# Patient Record
Sex: MALE | Race: WHITE | NOT HISPANIC OR LATINO | Employment: OTHER | URBAN - NONMETROPOLITAN AREA
[De-identification: names, ages, dates, MRNs, and addresses within clinical notes are randomized per-mention and may not be internally consistent; named-entity substitution may affect disease eponyms.]

---

## 2023-08-05 ENCOUNTER — HOSPITAL ENCOUNTER (EMERGENCY)
Facility: HOSPITAL | Age: 73
Discharge: HOME | End: 2023-08-05
Attending: FAMILY MEDICINE
Payer: MEDICARE

## 2023-08-05 ENCOUNTER — APPOINTMENT (OUTPATIENT)
Dept: RADIOLOGY | Facility: HOSPITAL | Age: 73
End: 2023-08-05
Payer: MEDICARE

## 2023-08-05 VITALS
HEIGHT: 70 IN | SYSTOLIC BLOOD PRESSURE: 165 MMHG | DIASTOLIC BLOOD PRESSURE: 98 MMHG | WEIGHT: 176 LBS | TEMPERATURE: 97.4 F | RESPIRATION RATE: 33 BRPM | OXYGEN SATURATION: 94 % | BODY MASS INDEX: 25.2 KG/M2 | HEART RATE: 86 BPM

## 2023-08-05 DIAGNOSIS — R00.2 PALPITATIONS: ICD-10-CM

## 2023-08-05 DIAGNOSIS — R07.89 OTHER CHEST PAIN: Primary | ICD-10-CM

## 2023-08-05 DIAGNOSIS — E16.2 HYPOGLYCEMIA: ICD-10-CM

## 2023-08-05 DIAGNOSIS — I10 HYPERTENSION: ICD-10-CM

## 2023-08-05 LAB
ALBUMIN SERPL-MCNC: 4 G/DL (ref 3.5–5.3)
ALP SERPL-CCNC: 68 U/L (ref 45–115)
ALT SERPL-CCNC: 14 U/L (ref 7–52)
ANION GAP SERPL CALC-SCNC: 9 MMOL/L (ref 3–11)
AST SERPL-CCNC: 22 U/L
BACTERIA #/AREA URNS HPF: ABNORMAL /HPF
BASOPHILS # BLD AUTO: 0.1 10*3/UL
BASOPHILS NFR BLD AUTO: 0.9 % (ref 0–2)
BILIRUB SERPL-MCNC: 0.68 MG/DL (ref 0.2–1.4)
BILIRUB UR QL: NEGATIVE
BNP SERPL-MCNC: 23 PG/ML (ref 0–100)
BUN SERPL-MCNC: 20 MG/DL (ref 7–25)
CALCIUM ALBUM COR SERPL-MCNC: 8.8 MG/DL (ref 8.6–10.3)
CALCIUM SERPL-MCNC: 8.8 MG/DL (ref 8.6–10.3)
CHLORIDE SERPL-SCNC: 100 MMOL/L (ref 98–107)
CLARITY UR: ABNORMAL
CO2 SERPL-SCNC: 25 MMOL/L (ref 21–32)
COLOR UR: YELLOW
CREAT SERPL-MCNC: 0.97 MG/DL (ref 0.7–1.3)
DELTA HIGH SENSITIVITY TROPONIN I, 1 HOUR: 0.3
EGFRCR SERPLBLD CKD-EPI 2021: 83 ML/MIN/1.73M*2
EOSINOPHIL # BLD AUTO: 0.1 10*3/UL
EOSINOPHIL NFR BLD AUTO: 1.1 % (ref 0–3)
ERYTHROCYTE [DISTWIDTH] IN BLOOD BY AUTOMATED COUNT: 16.5 % (ref 11.5–15)
EST. AVERAGE GLUCOSE BLD GHB EST-MCNC: 154.2 MG/DL
FIBRIN D-DIMER (NG/ML) IN PLATELET POOR PLASMA: 465 NG/MLFEU
GLUCOSE SERPL-MCNC: 210 MG/DL (ref 70–105)
GLUCOSE UR QL: 500 MG/DL
HBA1C MFR BLD: 7 % (ref 4.8–6)
HCT VFR BLD AUTO: 34.3 % (ref 38–50)
HGB BLD-MCNC: 11 G/DL (ref 13.2–17.2)
HGB UR QL: ABNORMAL
HYPOCHROMIA PRESENCE IN BLOOD, ANALYZER: ABNORMAL
KETONES UR-MCNC: 15 MG/DL
LEUKOCYTE ESTERASE UR QL STRIP: ABNORMAL
LYMPHOCYTES # BLD AUTO: 0.7 10*3/UL
LYMPHOCYTES NFR BLD AUTO: 10.8 % (ref 15–47)
MAGNESIUM SERPL-MCNC: 1.7 MG/DL (ref 1.8–2.4)
MCH RBC QN AUTO: 26.3 PG (ref 29–34)
MCHC RBC AUTO-ENTMCNC: 32.1 G/DL (ref 32–36)
MCV RBC AUTO: 82 FL (ref 82–97)
MONOCYTES # BLD AUTO: 0.6 10*3/UL
MONOCYTES NFR BLD AUTO: 9.5 % (ref 5–13)
NEUTROPHILS # BLD AUTO: 5.3 10*3/UL
NEUTROPHILS NFR BLD AUTO: 77.7 % (ref 46–70)
NITRITE UR QL: POSITIVE
PH UR: 6.5 PH
PLATELET # BLD AUTO: 250 10*3/UL (ref 130–350)
PMV BLD AUTO: 8 FL (ref 6.9–10.8)
POTASSIUM SERPL-SCNC: 3.3 MMOL/L (ref 3.5–5.1)
PROT SERPL-MCNC: 7.2 G/DL (ref 6–8.3)
PROT UR STRIP-MCNC: 30 MG/DL
RBC # BLD AUTO: 4.18 10*6/ΜL (ref 4.1–5.8)
RBC #/AREA URNS HPF: ABNORMAL /HPF
SODIUM SERPL-SCNC: 134 MMOL/L (ref 135–145)
SP GR UR: 1.02 (ref 1–1.03)
SQUAMOUS #/AREA URNS HPF: ABNORMAL /HPF
TROPONIN I SERPL-MCNC: 8 PG/ML
TROPONIN I SERPL-MCNC: 8.3 PG/ML
TSH SERPL DL<=0.05 MIU/L-ACNC: 0.88 UIU/ML (ref 0.34–4.82)
UROBILINOGEN UR-MCNC: 0.2 MG/DL
WBC # BLD AUTO: 6.8 10*3/UL (ref 3.7–9.6)
WBC #/AREA URNS HPF: ABNORMAL /HPF

## 2023-08-05 PROCEDURE — 99285 EMERGENCY DEPT VISIT HI MDM: CPT | Performed by: FAMILY MEDICINE

## 2023-08-05 PROCEDURE — 6360000200 HC RX 636 W HCPCS (ALT 250 FOR IP): Performed by: FAMILY MEDICINE

## 2023-08-05 PROCEDURE — 93005 ELECTROCARDIOGRAM TRACING: CPT | Performed by: FAMILY MEDICINE

## 2023-08-05 PROCEDURE — 85025 COMPLETE CBC W/AUTO DIFF WBC: CPT | Performed by: FAMILY MEDICINE

## 2023-08-05 PROCEDURE — 84443 ASSAY THYROID STIM HORMONE: CPT | Performed by: FAMILY MEDICINE

## 2023-08-05 PROCEDURE — 99284 EMERGENCY DEPT VISIT MOD MDM: CPT

## 2023-08-05 PROCEDURE — 83735 ASSAY OF MAGNESIUM: CPT | Performed by: FAMILY MEDICINE

## 2023-08-05 PROCEDURE — 83036 HEMOGLOBIN GLYCOSYLATED A1C: CPT | Performed by: FAMILY MEDICINE

## 2023-08-05 PROCEDURE — 71046 X-RAY EXAM CHEST 2 VIEWS: CPT

## 2023-08-05 PROCEDURE — 96366 THER/PROPH/DIAG IV INF ADDON: CPT

## 2023-08-05 PROCEDURE — 83880 ASSAY OF NATRIURETIC PEPTIDE: CPT | Performed by: FAMILY MEDICINE

## 2023-08-05 PROCEDURE — 81001 URINALYSIS AUTO W/SCOPE: CPT | Performed by: FAMILY MEDICINE

## 2023-08-05 PROCEDURE — 85379 FIBRIN DEGRADATION QUANT: CPT | Performed by: FAMILY MEDICINE

## 2023-08-05 PROCEDURE — 84484 ASSAY OF TROPONIN QUANT: CPT | Performed by: FAMILY MEDICINE

## 2023-08-05 PROCEDURE — 6370000100 HC RX 637 (ALT 250 FOR IP): Performed by: FAMILY MEDICINE

## 2023-08-05 PROCEDURE — 80053 COMPREHEN METABOLIC PANEL: CPT | Performed by: FAMILY MEDICINE

## 2023-08-05 PROCEDURE — 36415 COLL VENOUS BLD VENIPUNCTURE: CPT | Performed by: FAMILY MEDICINE

## 2023-08-05 PROCEDURE — 51798 US URINE CAPACITY MEASURE: CPT

## 2023-08-05 PROCEDURE — 96365 THER/PROPH/DIAG IV INF INIT: CPT

## 2023-08-05 RX ORDER — SULFAMETHOXAZOLE AND TRIMETHOPRIM 800; 160 MG/1; MG/1
1 TABLET ORAL ONCE
Status: COMPLETED | OUTPATIENT
Start: 2023-08-05 | End: 2023-08-05

## 2023-08-05 RX ORDER — ASPIRIN 81 MG/1
81 TABLET ORAL DAILY
COMMUNITY

## 2023-08-05 RX ORDER — MAGNESIUM SULFATE HEPTAHYDRATE 40 MG/ML
2 INJECTION, SOLUTION INTRAVENOUS ONCE
Status: COMPLETED | OUTPATIENT
Start: 2023-08-05 | End: 2023-08-05

## 2023-08-05 RX ORDER — METOPROLOL SUCCINATE 50 MG/1
50 TABLET, EXTENDED RELEASE ORAL DAILY
Qty: 30 TABLET | Refills: 0 | Status: SHIPPED | OUTPATIENT
Start: 2023-08-05 | End: 2023-09-04

## 2023-08-05 RX ORDER — ALPRAZOLAM 0.25 MG/1
0.25 TABLET ORAL 3 TIMES DAILY PRN
COMMUNITY

## 2023-08-05 RX ORDER — CELECOXIB 200 MG/1
200 CAPSULE ORAL 2 TIMES DAILY
COMMUNITY

## 2023-08-05 RX ORDER — PRAVASTATIN SODIUM 20 MG/1
20 TABLET ORAL DAILY
COMMUNITY

## 2023-08-05 RX ORDER — POTASSIUM CHLORIDE 750 MG/1
20 TABLET, FILM COATED, EXTENDED RELEASE ORAL ONCE
Status: COMPLETED | OUTPATIENT
Start: 2023-08-05 | End: 2023-08-05

## 2023-08-05 RX ORDER — INSULIN GLARGINE 100 [IU]/ML
INJECTION, SOLUTION SUBCUTANEOUS NIGHTLY
COMMUNITY

## 2023-08-05 RX ORDER — POTASSIUM CHLORIDE 750 MG/1
40 TABLET, FILM COATED, EXTENDED RELEASE ORAL ONCE
Status: COMPLETED | OUTPATIENT
Start: 2023-08-05 | End: 2023-08-05

## 2023-08-05 RX ORDER — NITROGLYCERIN 0.4 MG/1
0.4 TABLET SUBLINGUAL EVERY 5 MIN PRN
Qty: 10 TABLET | Refills: 0 | Status: SHIPPED | OUTPATIENT
Start: 2023-08-05

## 2023-08-05 RX ORDER — METOPROLOL SUCCINATE 25 MG/1
50 TABLET, EXTENDED RELEASE ORAL ONCE
Status: COMPLETED | OUTPATIENT
Start: 2023-08-05 | End: 2023-08-05

## 2023-08-05 RX ADMIN — METOPROLOL SUCCINATE 50 MG: 25 TABLET, EXTENDED RELEASE ORAL at 17:40

## 2023-08-05 RX ADMIN — POTASSIUM CHLORIDE 40 MEQ: 750 TABLET, FILM COATED, EXTENDED RELEASE ORAL at 16:10

## 2023-08-05 RX ADMIN — POTASSIUM CHLORIDE 20 MEQ: 750 TABLET, FILM COATED, EXTENDED RELEASE ORAL at 17:50

## 2023-08-05 RX ADMIN — SULFAMETHOXAZOLE AND TRIMETHOPRIM 1 PACKAGE: 800; 160 TABLET ORAL at 17:55

## 2023-08-05 RX ADMIN — MAGNESIUM SULFATE HEPTAHYDRATE 2 G: 40 INJECTION, SOLUTION INTRAVENOUS at 16:10

## 2023-08-05 ASSESSMENT — ENCOUNTER SYMPTOMS
NAUSEA: 1
DIZZINESS: 0
MYALGIAS: 0
HEADACHES: 0
COUGH: 1
BLOOD IN STOOL: 0
HEMATURIA: 0
LIGHT-HEADEDNESS: 0
DYSURIA: 1
SHORTNESS OF BREATH: 1
PALPITATIONS: 1
DIFFICULTY URINATING: 1
VOMITING: 0
ARTHRALGIAS: 0
DIARRHEA: 0
ABDOMINAL PAIN: 0
CONSTIPATION: 0
CHILLS: 0
FEVER: 0
SORE THROAT: 0

## 2023-08-05 NOTE — DISCHARGE INSTRUCTIONS
Call [phone] [facility] on Monday to schedule stress test. If they are unable to schedule this as advised for Tuesday or you have any concerns for worsening chest pain or chest pain that is not resolving quickly with one nitro, then please return to the ED.     Start metoprolol 50mg daily. First dose given in the ED, but please continue with next dose tomorrow morning and take every morning.     You can take nitro 1 dose up to every 5 minutes under the tongue. Do not take more than 3 doses within 15 minutes. If you need to take subsequent doses or chest pain is not improving with this, return to the ED. If you experience racing heart that is not improving within a few minutes or you feel like you are going to pass out with this, return to the ED.    Take your Tresiba daily as prescribed but decrease dose to 10U. Follow up with your primary care doctor when you return to [place].     Start Sulfamethoxazole for urinary tract infection 1 tablet twice daily for one week. If symptoms persist, follow up with your primary doctor or urology when you are completed with antibiotics. Return for further evaluation if new/worsening urinary symptoms or development of fevers/chills.

## 2023-08-05 NOTE — ED PROVIDER NOTES
HPI:      Chief Complaint   Patient presents with    Chest Pain     Pt states having intermittent chest pressure for the last two days. Pt has a history of Afib.       HPI  Shashank is a 72 y.o. male patient presenting to the ED for chest pain.     For the last 2-3 days has had episodes of chest pressure, dull ache at his chest, racing heart with palpitations, shortness of breath, coughing and nausea that lasts a few minutes. Triggered by exertion, relieved by rest. No symptoms at this time. History of a few episodes in 2009 of atrial fibrillation, says he was drinking heavily and under emotional stress at that time. No longer on any beta blockers, has never been on anticoagulation.     Also a type 2 diabetic and recently switched from Tresiba to Lantus. Takes 25-30U of Lantus daily PRN if glucose >250 but every morning his Dexcom alarm goes off that he is less than 70. Also on Ozempic 0.5mg weekly.     He has been traveling the last few days from [place] and staying at the [facility] [place] on [address].     Arrives by ambulance.    He has a history of bladder and testicular cancer, denies any history of blood clots, no lower extremity pain or swelling. On testosterone replacement for hypogonadism.    He is on Xanax daily, but when he runs out of this he takes a venlafaxine PRN but says this is not prescribed by his doctor. Trazodone for insomnia.     Drinks a pint of alcohol at a time, notes will have spells of depression.     History of HTN and HLD noted in chart, not on BP medication.     HISTORY:    Not on File       No current facility-administered medications on file prior to encounter.     Current Outpatient Medications on File Prior to Encounter   Medication Sig    aspirin 81 mg EC tablet Take 1 tablet (81 mg total) by mouth daily    semaglutide (OZEMPIC) 0.25 mg or 0.5 mg(2 mg/1.5 mL) injection Inject under the skin once a week    celecoxib (CeleBREX) 200 mg capsule Take 1 capsule (200 mg total) by  mouth 2 (two) times a day    pravastatin (PRAVACHOL) 20 mg tablet Take 1 tablet (20 mg total) by mouth daily    insulin glargine (Lantus U-100 Insulin) 100 unit/mL injection Inject under the skin nightly    ALPRAZolam (XANAX) 0.25 mg tablet Take 1 tablet (0.25 mg total) by mouth 3 (three) times a day as needed for anxiety Max Daily Amount: 0.75 mg    VENLAFAXINE BESYLATE ORAL Take by mouth        Past Medical History:   Diagnosis Date    A-fib (CMS/[facility])     Arthritis     Diabetes mellitus (CMS/[facility])     Hypercholesteremia        Past Surgical History:   Procedure Laterality Date    JOINT REPLACEMENT         History reviewed. No pertinent family history.    Social History     Tobacco Use    Smoking status: Former     Types: Cigarettes    Smokeless tobacco: Never   Vaping Use    Vaping Use: Never used   Substance Use Topics    Alcohol use: Yes     Comment: 1 pint a week intermittenly    Drug use: Never       ROS:  Review of Systems   Constitutional:  Negative for chills and fever.   HENT:  Negative for congestion and sore throat.    Respiratory:  Positive for cough and shortness of breath.    Cardiovascular:  Positive for chest pain and palpitations. Negative for leg swelling.   Gastrointestinal:  Positive for nausea. Negative for abdominal pain, blood in stool, constipation, diarrhea and vomiting.   Genitourinary:  Positive for difficulty urinating and dysuria (since cystoscopy, gradually improving). Negative for decreased urine volume, hematuria, penile pain, penile swelling and testicular pain.   Musculoskeletal:  Negative for arthralgias and myalgias.   Neurological:  Negative for dizziness, light-headedness and headaches.       PE:  ED Triage Vitals   Temp Heart Rate Resp BP SpO2   08/05/23 1516 08/05/23 1516 08/05/23 1516 08/05/23 1515 08/05/23 1516   36.3 °C (97.4 °F) 95 18 (!) 157/106 95 %      Mean BP (mmHg) Temp src Heart Rate Source Patient Position BP Location   08/05/23 1515 -- -- -- --   126          FiO2  (%)       --                  Physical Exam  Vitals and nursing note reviewed.   Constitutional:       General: He is not in acute distress.     Appearance: Normal appearance. He is normal weight. He is not ill-appearing, toxic-appearing or diaphoretic.   HENT:      Head: Normocephalic and atraumatic.      Mouth/Throat:      Mouth: Mucous membranes are moist.      Pharynx: Oropharynx is clear.   Eyes:      General: No scleral icterus.     Conjunctiva/sclera: Conjunctivae normal.   Cardiovascular:      Rate and Rhythm: Normal rate and regular rhythm.      Heart sounds: No murmur heard.  Pulmonary:      Effort: Pulmonary effort is normal. No respiratory distress.      Breath sounds: Normal breath sounds. No stridor. No wheezing or rhonchi.   Abdominal:      General: Abdomen is flat. Bowel sounds are normal. There is no distension.      Palpations: Abdomen is soft. There is no mass.      Tenderness: There is no abdominal tenderness.   Musculoskeletal:         General: No swelling or tenderness.      Right lower leg: No edema.      Left lower leg: No edema.   Neurological:      General: No focal deficit present.      Mental Status: He is alert and oriented to person, place, and time.   Psychiatric:         Mood and Affect: Mood normal.         ED LABS:  Labs Reviewed   CBC WITH AUTO DIFFERENTIAL - Abnormal       Result Value    WBC 6.8      RBC 4.18      Hemoglobin 11.0 (*)     Hematocrit 34.3 (*)     MCV 82.0      MCH 26.3 (*)     MCHC 32.1      RDW 16.5 (*)     Platelets 250      MPV 8.0      Neutrophils% 77.7 (*)     Lymphocytes% 10.8 (*)     Monocytes% 9.5      Eosinophils% 1.1      Basophils% 0.9      ANC (auto diff) 5.30      Lymphocytes Absolute 0.70      Monocytes Absolute 0.60      Eosinophils Absolute 0.10      Basophils Absolute 0.10      Hypochromia 1+     COMPREHENSIVE METABOLIC PANEL - Abnormal    Sodium 134 (*)     Potassium 3.3 (*)     Chloride 100      CO2 25      Anion Gap 9      BUN 20      Creatinine  0.97      Glucose 210 (*)     Calcium 8.8      AST 22      ALT (SGPT) 14      Alkaline Phosphatase 68      Total Protein 7.2      Albumin 4.0      Total Bilirubin 0.68      Corrected Calcium 8.8      eGFR 83      Narrative:     Calculation based on the 2021 Chronic Kidney Disease Epidemiology Collaboration (CKD-EPI) equation refit without adjustment for race.   MAGNESIUM - Abnormal    Magnesium 1.7 (*)    HEMOGLOBIN A1C (GLYCOSYLATED) - Abnormal    Hemoglobin A1C 7.0 (*)     Estimated Average Glucose 154.2      Narrative:     This assay is FDA cleared and indicated to monitor long-term glucose control in individuals with diabetes mellitus.                                    ---------American Diabetes Association (ADA)2021----------                                    Therapeutic goals for glycemic control:                   All ages: <7% HbA1C                                    Falsely low HbA1c results may be observed in patients with clinical conditions that shorten erythrocyte life span or decrease mean erythrocyte age. HbA1c may not accurately reflect glycemic control when clinical conditions that affect erythrocyte survival are present. Fructosamine may be used as an alternate measurement of glycemic control.                     URINALYSIS, MICROSCOPIC ONLY - Abnormal    RBC, Urine 3-5 (*)     WBC, Urine 50-75 (*)     Squamous Epithelial, Urine None seen      Bacteria, Urine Many (*)    URINALYSIS, DIPSTICK ONLY, FOR USE WITH MICROSCOPIC PANEL - Abnormal    Color, Urine Yellow      Clarity, Urine Cloudy (*)     Specific Gravity, Urine 1.020      Leukocytes, Urine Moderate (*)     Nitrite, Urine Positive (*)     Protein, Urine 30 (*)     Ketones, Urine 15 (*)     Urobilinogen, Urine 0.2      Bilirubin, Urine Negative      Blood, Urine Trace-intact (*)     Glucose, Urine 500 (*)     pH, Urine 6.5     D-DIMER, QUANTITATIVE - Normal    D-Dimer, Quant (ng/mL) 465      Narrative:     D-dimer values increase with age  "and this can make VTE exclusion of an older population difficult. To address this, The American College of Physicians, based on best available evidence and recent guidelines, recommends that clinicians use age-adjusted D-dimer thresholds in patients greater than 50 years of age with: a) a low probability of PE who do not meet all Pulmonary Embolism Rule Out Criteria, or b) in those with intermediate probability of PE. The formula for an age-adjusted D-dimer cut-off is \"ageX10 ng/mL\". For example, a 60 year old patient would have an age-adjusted cut-off of 600 ng/mL FEU and an 80 year old would be 800 ng/mL FEU.                  D-dimer values < or =500 ng/mL FEU may be used in conjunction with clinical pretest probability to exclude deep vein thrombosis (DVT) and pulmonary embolism (PE).   HIGH SENSITIVE TROPONIN I - Normal    hsTnI 0 Hour 8.0     HIGH SENSITIVE TROPONIN I, 1 HOUR - Normal    hsTnI 1 hr 8.3      Delta from 0 Hour 0.3     TSH - Normal    TSH 0.881     B-TYPE NATRIURETIC PEPTIDE (BNP) - Normal    BNP 23     HIGH SENSITIVE TROPONIN I PANEL (0HR, 1HR, 2HR)    Narrative:     The following orders were created for panel order HS Troponin I Panel (0HR, 1HR, 2HR) Blood, Venous.                  Procedure                               Abnormality         Status                                     ---------                               -----------         ------                                     HS Troponin I[[id number]]                 Normal              Final result                               1HR High Sensitive Trop I[[id number]]     Normal              Final result                               2HR High Sensitive Tropon...[[id number]]                                                                                   Please view results for these tests on the individual orders.   URINALYSIS WITH MICROSCOPIC    Narrative:     The following orders were created for panel order Urinalysis w/microscopic Urine, " Clean Catch.                  Procedure                               Abnormality         Status                                     ---------                               -----------         ------                                     Urinalysis, microscopic U...[[id number]]  Abnormal            Final result                               Urinalysis, dipstick Urin...[[id number]]  Abnormal            Final result                                                 Please view results for these tests on the individual orders.         ED IMAGES:  X-ray chest 2 views   Final Result   IMPRESSION:   No acute radiographic cardiopulmonary process.          ED PROCEDURES:  Procedures    ED COURSE:       MDM:  MDM  EKG shows a normal sinus rhythm. He is asymptomatic at this time. Will continue to monitor on tele. Rates in the 90s to 80s. Blood pressure mildly elevated, improving. He does have a HTN history but not currently on medications.     Hemoglobin is 11, down from 14 a year ago with outside record review.    With recent travel, will check d-dimer, no lower extremity symptoms. D-dimer is not elevated. Pulse is in the 90s here. Wells score is low risk for PE.     He shows me with Dexcom his glucose is currently 178, was 118 with EMS. Discussed with him hypoglycemia episodes may be contributing to his chest symptoms. He also has a history of anxiety and current alcohol abuse. Does not use caffeine regularly.     His A1c is 7.    He is at high risk of ACS, troponin initially 8. Chest Xray is clear, no fluid overload on exam and no CHF history. BNP is not elevated.     K is 3.3 and magnesium is 1.7, will replace with 2g IV magnesium and oral potassium.     Bladder scan 115cc, has been unable to void here.     No evidence of atrial fibrillation on telemetry, no recurrence of chest discomfort. 50mg metoprolol XL given in the ED. Discussed with cardiology due to concern of anginal symptoms and multiple risk factors. Dr. Dumont  recommending outpatient stress test in [place] on Tuesday with possible stenting. Continue metoprolol and sent with nitro. Strict return precautions reviewed with patient and wife and they are comfortable with this, decline overnight observation in the hospital. Also sent with Bactrim for UTI with outpatient follow up as he had cysto one month ago and says he has had two courses of cipro since then. Discussed risk factors and stopping caffeine, alcohol, adjusting insulin to reduce hypoglycemic episodes, taking it easy activity level wise.       Discharge Instructions         Call [phone] [facility] on Monday to schedule stress test. If they are unable to schedule this as advised for Tuesday or you have any concerns for worsening chest pain or chest pain that is not resolving quickly with one nitro, then please return to the ED.     Start metoprolol 50mg daily. First dose given in the ED, but please continue with next dose tomorrow morning and take every morning.     You can take nitro 1 dose up to every 5 minutes under the tongue. Do not take more than 3 doses within 15 minutes. If you need to take subsequent doses or chest pain is not improving with this, return to the ED. If you experience racing heart that is not improving within a few minutes or you feel like you are going to pass out with this, return to the ED.    Take your Tresiba daily as prescribed but decrease dose to 10U. Follow up with your primary care doctor when you return to [place].     Start Sulfamethoxazole for urinary tract infection 1 tablet twice daily for one week. If symptoms persist, follow up with your primary doctor or urology when you are completed with antibiotics. Return for further evaluation if new/worsening urinary symptoms or development of fevers/chills.               Final diagnoses:   [R07.89] Other chest pain   [R00.2] Palpitations   [I10] Hypertension   [E16.2] Hypoglycemia         A voice  recognition program was used to aid in documentation of this record.  Sometimes words are not printed exactly as they were spoken.  While efforts were made to carefully edit and correct any inaccuracies, some areas may be present; please take these into context.  Please contact the provider if areas are identified.       Roxy Bethea MD  08/05/23 0687

## 2023-08-07 PROCEDURE — 93010 ELECTROCARDIOGRAM REPORT: CPT | Performed by: STUDENT IN AN ORGANIZED HEALTH CARE EDUCATION/TRAINING PROGRAM

## 2023-08-16 ENCOUNTER — TELEPHONE (OUTPATIENT)
Dept: FAMILY MEDICINE | Facility: CLINIC | Age: 73
End: 2023-08-16
Payer: MEDICARE

## 2023-08-16 NOTE — TELEPHONE ENCOUNTER
Patient calls wondering what dose he was given of Metoprolol when he was in our hospital recently.  Advised patient of prescription information.  No further questions.

## 2024-11-25 ENCOUNTER — APPOINTMENT (RX ONLY)
Dept: URBAN - NONMETROPOLITAN AREA CLINIC 42 | Facility: CLINIC | Age: 74
Setting detail: DERMATOLOGY
End: 2024-11-25

## 2024-11-25 DIAGNOSIS — L82.1 OTHER SEBORRHEIC KERATOSIS: ICD-10-CM

## 2024-11-25 DIAGNOSIS — Z71.89 OTHER SPECIFIED COUNSELING: ICD-10-CM

## 2024-11-25 DIAGNOSIS — L82.0 INFLAMED SEBORRHEIC KERATOSIS: ICD-10-CM

## 2024-11-25 DIAGNOSIS — L57.0 ACTINIC KERATOSIS: ICD-10-CM

## 2024-11-25 DIAGNOSIS — L57.8 OTHER SKIN CHANGES DUE TO CHRONIC EXPOSURE TO NONIONIZING RADIATION: ICD-10-CM

## 2024-11-25 DIAGNOSIS — T07XXXA INSECT BITE, NONVENOMOUS, OF OTHER, MULTIPLE, AND UNSPECIFIED SITES, WITHOUT MENTION OF INFECTION: ICD-10-CM

## 2024-11-25 PROBLEM — S80.861A INSECT BITE (NONVENOMOUS), RIGHT LOWER LEG, INITIAL ENCOUNTER: Status: ACTIVE | Noted: 2024-11-25

## 2024-11-25 PROBLEM — S90.562A INSECT BITE (NONVENOMOUS), LEFT ANKLE, INITIAL ENCOUNTER: Status: ACTIVE | Noted: 2024-11-25

## 2024-11-25 PROCEDURE — 17110 DESTRUCTION B9 LES UP TO 14: CPT

## 2024-11-25 PROCEDURE — 99203 OFFICE O/P NEW LOW 30 MIN: CPT | Mod: 25

## 2024-11-25 PROCEDURE — 17000 DESTRUCT PREMALG LESION: CPT | Mod: 59

## 2024-11-25 PROCEDURE — 17003 DESTRUCT PREMALG LES 2-14: CPT | Mod: 59

## 2024-11-25 PROCEDURE — ? COUNSELING

## 2024-11-25 PROCEDURE — ? LIQUID NITROGEN

## 2024-11-25 ASSESSMENT — LOCATION SIMPLE DESCRIPTION DERM
LOCATION SIMPLE: ABDOMEN
LOCATION SIMPLE: LEFT THIGH
LOCATION SIMPLE: RIGHT FOREHEAD
LOCATION SIMPLE: LEFT CHEEK
LOCATION SIMPLE: UPPER BACK
LOCATION SIMPLE: LEFT FOREARM
LOCATION SIMPLE: LEFT ANKLE
LOCATION SIMPLE: RIGHT FOREARM
LOCATION SIMPLE: LEFT EAR
LOCATION SIMPLE: RIGHT PRETIBIAL REGION
LOCATION SIMPLE: LEFT FOREHEAD

## 2024-11-25 ASSESSMENT — LOCATION DETAILED DESCRIPTION DERM
LOCATION DETAILED: LEFT INFERIOR HELIX
LOCATION DETAILED: RIGHT MEDIAL FOREHEAD
LOCATION DETAILED: LEFT PROXIMAL DORSAL FOREARM
LOCATION DETAILED: LEFT FOREHEAD
LOCATION DETAILED: RIGHT PROXIMAL DORSAL FOREARM
LOCATION DETAILED: LEFT POSTERIOR LATERAL DISTAL THIGH
LOCATION DETAILED: LEFT INFERIOR FOREHEAD
LOCATION DETAILED: EPIGASTRIC SKIN
LOCATION DETAILED: RIGHT DISTAL PRETIBIAL REGION
LOCATION DETAILED: LEFT CENTRAL MALAR CHEEK
LOCATION DETAILED: INFERIOR THORACIC SPINE
LOCATION DETAILED: LEFT ANKLE

## 2024-11-25 ASSESSMENT — LOCATION ZONE DERM
LOCATION ZONE: TRUNK
LOCATION ZONE: ARM
LOCATION ZONE: FACE
LOCATION ZONE: LEG
LOCATION ZONE: EAR

## 2024-11-25 NOTE — PROCEDURE: LIQUID NITROGEN
Show Aperture Variable?: Yes
Render Note In Bullet Format When Appropriate: No
Detail Level: Detailed
Post-Care Instructions: I reviewed with the patient in detail post-care instructions. Patient is to wear sunprotection, and avoid picking at any of the treated lesions. Pt may apply Vaseline to crusted or scabbing areas.
Consent: The patient's consent was obtained including but not limited to risks of crusting, scabbing, blistering, scarring, darker or lighter pigmentary change, recurrence, incomplete removal and infection.
Medical Necessity Information: It is in your best interest to select a reason for this procedure from the list below. All of these items fulfill various CMS LCD requirements except the new and changing color options.
Medical Necessity Clause: This procedure was medically necessary because the lesions that were treated were:
Spray Paint Text: The liquid nitrogen was applied to the skin utilizing a spray paint frosting technique.
Number Of Freeze-Thaw Cycles: 2 freeze-thaw cycles
Duration Of Freeze Thaw-Cycle (Seconds): 0

## 2025-02-19 ENCOUNTER — APPOINTMENT (OUTPATIENT)
Age: 75
Setting detail: DERMATOLOGY
End: 2025-02-19

## 2025-02-19 DIAGNOSIS — B35.4 TINEA CORPORIS: ICD-10-CM

## 2025-02-19 DIAGNOSIS — B35.6 TINEA CRURIS: ICD-10-CM

## 2025-02-19 PROBLEM — D48.5 NEOPLASM OF UNCERTAIN BEHAVIOR OF SKIN: Status: ACTIVE | Noted: 2025-02-19

## 2025-02-19 PROCEDURE — 11102 TANGNTL BX SKIN SINGLE LES: CPT

## 2025-02-19 PROCEDURE — ? BIOPSY BY SHAVE METHOD

## 2025-02-19 PROCEDURE — 99213 OFFICE O/P EST LOW 20 MIN: CPT | Mod: 25

## 2025-02-19 PROCEDURE — ? KOH PREP

## 2025-02-19 PROCEDURE — ? COUNSELING

## 2025-02-19 PROCEDURE — ? PRESCRIPTION

## 2025-02-19 RX ORDER — KETOCONAZOLE 20 MG/G
CREAM TOPICAL
Qty: 60 | Refills: 0 | Status: ERX | COMMUNITY
Start: 2025-02-19

## 2025-02-19 RX ORDER — TERBINAFINE HYDROCHLORIDE 250 MG/1
TABLET ORAL
Qty: 21 | Refills: 0 | Status: ERX | COMMUNITY
Start: 2025-02-19

## 2025-02-19 RX ADMIN — TERBINAFINE HYDROCHLORIDE: 250 TABLET ORAL at 00:00

## 2025-02-19 RX ADMIN — KETOCONAZOLE: 20 CREAM TOPICAL at 00:00

## 2025-02-19 ASSESSMENT — LOCATION SIMPLE DESCRIPTION DERM
LOCATION SIMPLE: GROIN
LOCATION SIMPLE: RIGHT BUTTOCK

## 2025-02-19 ASSESSMENT — LOCATION DETAILED DESCRIPTION DERM
LOCATION DETAILED: RIGHT MEDIAL BUTTOCK
LOCATION DETAILED: RIGHT INGUINAL CREASE

## 2025-02-19 ASSESSMENT — LOCATION ZONE DERM: LOCATION ZONE: TRUNK

## 2025-02-19 NOTE — PROCEDURE: BIOPSY BY SHAVE METHOD
Detail Level: Detailed
Depth Of Biopsy: dermis
Was A Bandage Applied: Yes
Size Of Lesion In Cm: 0.5
Biopsy Type: H and E
Biopsy Method: Personna blade
Anesthesia Type: 1% lidocaine with epinephrine
Additional Anesthesia Volume In Cc (Will Not Render If 0): 0
Hemostasis: Drysol
Wound Care: Petrolatum
Dressing: bandage
Destruction After The Procedure: No
Type Of Destruction Used: Curettage
Curettage Text: The wound bed was treated with curettage after the biopsy was performed.
Cryotherapy Text: The wound bed was treated with cryotherapy after the biopsy was performed.
Electrodesiccation Text: The wound bed was treated with electrodesiccation after the biopsy was performed.
Electrodesiccation And Curettage Text: The wound bed was treated with electrodesiccation and curettage after the biopsy was performed.
Silver Nitrate Text: The wound bed was treated with silver nitrate after the biopsy was performed.
Lab: [id number]
Lab Facility: [id number]
Medical Necessity Information: It is in your best interest to select a reason for this procedure from the list below. All of these items fulfill various CMS LCD requirements except the new and changing color options.
Consent was obtained and risks were reviewed including but not limited to scarring, infection, bleeding, scabbing, incomplete removal, nerve damage and allergy to anesthesia.
Post-Care Instructions: I reviewed with the patient in detail post-care instructions. Patient is to keep the biopsy site dry overnight, and then apply vasoline twice daily until healed. Patient may apply hydrogen peroxide soaks to remove any crusting.
Notification Instructions: Patient will be notified of biopsy results. However, patient instructed to call the office if not contacted within 2 weeks.
Billing Type: Third-Party Bill
Information: Selecting Yes will display possible errors in your note based on the variables you have selected. This validation is only offered as a suggestion for you. PLEASE NOTE THAT THE VALIDATION TEXT WILL BE REMOVED WHEN YOU FINALIZE YOUR NOTE. IF YOU WANT TO FAX A PRELIMINARY NOTE YOU WILL NEED TO TOGGLE THIS TO 'NO' IF YOU DO NOT WANT IT IN YOUR FAXED NOTE.

## 2025-03-12 ENCOUNTER — APPOINTMENT (OUTPATIENT)
Age: 75
Setting detail: DERMATOLOGY
End: 2025-03-12

## 2025-03-12 DIAGNOSIS — B35.6 TINEA CRURIS: ICD-10-CM | Status: RESOLVING

## 2025-03-12 DIAGNOSIS — B35.4 TINEA CORPORIS: ICD-10-CM | Status: WELL CONTROLLED

## 2025-03-12 PROCEDURE — ? COUNSELING

## 2025-03-12 PROCEDURE — 99213 OFFICE O/P EST LOW 20 MIN: CPT

## 2025-03-12 ASSESSMENT — LOCATION SIMPLE DESCRIPTION DERM
LOCATION SIMPLE: RIGHT BUTTOCK
LOCATION SIMPLE: GROIN

## 2025-03-12 ASSESSMENT — LOCATION ZONE DERM: LOCATION ZONE: TRUNK

## 2025-03-12 ASSESSMENT — LOCATION DETAILED DESCRIPTION DERM
LOCATION DETAILED: RIGHT INGUINAL CREASE
LOCATION DETAILED: RIGHT MEDIAL BUTTOCK